# Patient Record
Sex: MALE | Race: WHITE | NOT HISPANIC OR LATINO | Employment: UNEMPLOYED | URBAN - METROPOLITAN AREA
[De-identification: names, ages, dates, MRNs, and addresses within clinical notes are randomized per-mention and may not be internally consistent; named-entity substitution may affect disease eponyms.]

---

## 2023-06-21 ENCOUNTER — HOSPITAL ENCOUNTER (EMERGENCY)
Facility: HOSPITAL | Age: 38
Discharge: HOME | End: 2023-06-21
Attending: EMERGENCY MEDICINE
Payer: COMMERCIAL

## 2023-06-21 VITALS
HEART RATE: 94 BPM | OXYGEN SATURATION: 94 % | RESPIRATION RATE: 22 BRPM | TEMPERATURE: 98.6 F | SYSTOLIC BLOOD PRESSURE: 114 MMHG | DIASTOLIC BLOOD PRESSURE: 68 MMHG

## 2023-06-21 DIAGNOSIS — R21 RASH: ICD-10-CM

## 2023-06-21 DIAGNOSIS — M25.40 JOINT SWELLING: Primary | ICD-10-CM

## 2023-06-21 LAB — B BURGDOR IGG+IGM SER-ACNC: <0.2 AI

## 2023-06-21 PROCEDURE — 36415 COLL VENOUS BLD VENIPUNCTURE: CPT | Performed by: EMERGENCY MEDICINE

## 2023-06-21 PROCEDURE — 86618 LYME DISEASE ANTIBODY: CPT | Performed by: EMERGENCY MEDICINE

## 2023-06-21 RX ORDER — PREDNISONE 10 MG/1
40 TABLET ORAL DAILY
Qty: 40 TABLET | Refills: 0 | Status: SHIPPED | OUTPATIENT
Start: 2023-06-21 | End: 2023-07-01

## 2023-06-21 RX ORDER — KETOROLAC TROMETHAMINE 30 MG/ML
30 INJECTION, SOLUTION INTRAMUSCULAR; INTRAVENOUS ONCE
Status: COMPLETED | OUTPATIENT
Start: 2023-06-21 | End: 2023-06-21

## 2023-06-21 RX ADMIN — KETOROLAC TROMETHAMINE 30 MG: 30 INJECTION, SOLUTION INTRAMUSCULAR at 03:06

## 2023-06-21 RX ADMIN — PREDNISONE 50 MG: 20 TABLET ORAL at 03:05

## 2023-06-21 NOTE — DISCHARGE INSTRUCTIONS
Take all of the medication as directed  Follow-up with your primary care physician in the next 24 to 48 hours  Return to the emergency department at anytime for any new or worsening symptoms

## 2023-06-21 NOTE — ED PROVIDER NOTES
History  Chief Complaint   Patient presents with   • Foot Swelling     Pt  C/o swollen legs/ankles w/pain  Recently went hiking and also to a concert  Tried taking benadryl  71-year-old male presents to the emergency department for evaluation of swelling and pain of his right foot and ankle  Patient states that he was out hiking this weekend and sustained quite a few bug bites  He started with some pain and swelling yesterday that progressively worsened  States that he took some Benadryl which he believes improved the swelling  He denies seeing any ticks or strange bugs on his skin  He also denies any other episode systemic symptoms  History provided by:  Patient   used: No        None       No past medical history on file  No past surgical history on file  No family history on file  I have reviewed and agree with the history as documented  No existing history information found  No existing history information found  Review of Systems   Constitutional: Negative for fever  Respiratory: Negative for shortness of breath  Cardiovascular: Negative for chest pain  Gastrointestinal: Negative for nausea and vomiting  Musculoskeletal: Positive for arthralgias, joint swelling and myalgias  Skin: Positive for rash  Neurological: Negative for light-headedness  All other systems reviewed and are negative  Physical Exam  Physical Exam  Vitals and nursing note reviewed  Constitutional:       General: He is not in acute distress  Appearance: He is obese  He is not toxic-appearing  HENT:      Head: Normocephalic  Mouth/Throat:      Mouth: Mucous membranes are moist    Cardiovascular:      Pulses: Normal pulses  Pulmonary:      Effort: Pulmonary effort is normal    Musculoskeletal:      Right foot: Normal range of motion and normal capillary refill  Swelling and tenderness present   No deformity, foot drop, prominent metatarsal heads, laceration, bony tenderness or crepitus  Normal pulse  Comments: Patient has what he describes as several bug bites on his right lower leg  Skin:     General: Skin is warm and dry  Neurological:      General: No focal deficit present  Mental Status: He is alert and oriented to person, place, and time  Mental status is at baseline  Sensory: No sensory deficit  Motor: No weakness  Vital Signs  ED Triage Vitals [06/21/23 0225]   Temperature Pulse Respirations Blood Pressure SpO2   99 6 °F (37 6 °C) 92 22 119/67 97 %      Temp Source Heart Rate Source Patient Position - Orthostatic VS BP Location FiO2 (%)   Oral Monitor Lying Right arm --      Pain Score       4           Vitals:    06/21/23 0225 06/21/23 0330 06/21/23 0430   BP: 119/67 117/66 114/68   Pulse: 92 90 94   Patient Position - Orthostatic VS: Lying Lying Lying         Visual Acuity      ED Medications  Medications   ketorolac (TORADOL) injection 30 mg (30 mg Intramuscular Given 6/21/23 0306)   predniSONE tablet 50 mg (50 mg Oral Given 6/21/23 0305)       Diagnostic Studies  Results Reviewed     Procedure Component Value Units Date/Time    Lyme Antibody Profile with reflex to WB [[id number]]  (Normal) Collected: 06/21/23 0306    Lab Status: Final result Specimen: Blood from Arm, Left Updated: 06/21/23 1033     Lyme Total Antibodies <0 2 AI                  No orders to display              Procedures  Procedures         ED Course                               SBIRT 20yo+    Flowsheet Row Most Recent Value   Initial Alcohol Screen: US AUDIT-C     1  How often do you have a drink containing alcohol? 0 Filed at: 06/21/2023 0231   2  How many drinks containing alcohol do you have on a typical day you are drinking? 0 Filed at: 06/21/2023 0231   3a  Male UNDER 65: How often do you have five or more drinks on one occasion? 0 Filed at: 06/21/2023 0231   3b  FEMALE Any Age, or MALE 65+:  How often do you have 4 or more drinks on one occassion? 0 Filed at: 06/21/2023 0231   Audit-C Score 0 Filed at: 06/21/2023 0231   MARITA: How many times in the past year have you    Used an illegal drug or used a prescription medication for non-medical reasons? Never Filed at: 06/21/2023 0231                    Medical Decision Making  82-year-old healthy male presents to the emergency department for evaluation of some right lower extremity edema and swelling after sustaining multiple insect bites on his leg while camping  He has no other systemic symptoms at this time  Patient does have multiple areas of a papular rash on his right lower extremity  He has very mild appreciable swelling of the surrounding areas of the insect bite marks that has extended down to the foot  Neurovascularly his foot is intact  Patient will plan on steroids to help deal with the swelling and inflammation  I have also sent off a Lyme titer  Plan is to manage his symptoms supportively and have him follow-up with his primary care physician in the next 24 to 48 hours  He is also given strict return precautions  Patient states that he understands and agrees with plan as discussed and all questions answered prior to discharge  Amount and/or Complexity of Data Reviewed  Labs: ordered  Risk  Prescription drug management  Disposition  Final diagnoses:   Joint swelling   Rash     Time reflects when diagnosis was documented in both MDM as applicable and the Disposition within this note     Time User Action Codes Description Comment    6/21/2023  3:27 AM Vijay Guzman Add [M25 40] Joint swelling     6/21/2023  3:27 AM Vijay Guzman Add [R21] Rash       ED Disposition     ED Disposition   Discharge    Condition   Stable    Date/Time   Wed Jun 21, 2023  3:27 AM    Comment   Rudy Maloney discharge to home/self care                 Follow-up Information     Follow up With Specialties Details Why Contact Info    your primary doctor  Call today            Discharge Medication List as of 6/21/2023  4:10 AM      START taking these medications    Details   predniSONE 10 mg tablet Take 4 tablets (40 mg total) by mouth daily for 10 days, Starting Wed 6/21/2023, Until Sat 7/1/2023, Print             No discharge procedures on file      PDMP Review     None          ED Provider  Electronically Signed by           Melanie Quiñones MD  06/22/23 0157